# Patient Record
Sex: MALE | Race: OTHER | NOT HISPANIC OR LATINO | URBAN - METROPOLITAN AREA
[De-identification: names, ages, dates, MRNs, and addresses within clinical notes are randomized per-mention and may not be internally consistent; named-entity substitution may affect disease eponyms.]

---

## 2019-08-28 ENCOUNTER — INPATIENT (INPATIENT)
Facility: HOSPITAL | Age: 69
LOS: 0 days | Discharge: HOME | DRG: 287 | End: 2019-08-28
Attending: INTERNAL MEDICINE | Admitting: INTERNAL MEDICINE
Payer: MEDICARE

## 2019-08-28 VITALS
HEART RATE: 57 BPM | TEMPERATURE: 98 F | DIASTOLIC BLOOD PRESSURE: 68 MMHG | SYSTOLIC BLOOD PRESSURE: 124 MMHG | RESPIRATION RATE: 18 BRPM | OXYGEN SATURATION: 100 %

## 2019-08-28 DIAGNOSIS — Z98.890 OTHER SPECIFIED POSTPROCEDURAL STATES: Chronic | ICD-10-CM

## 2019-08-28 LAB
ALBUMIN SERPL ELPH-MCNC: 4.4 G/DL — SIGNIFICANT CHANGE UP (ref 3.3–5)
ALP SERPL-CCNC: 46 U/L — SIGNIFICANT CHANGE UP (ref 40–120)
ALT FLD-CCNC: 58 U/L — HIGH (ref 10–45)
ANION GAP SERPL CALC-SCNC: 11 MMOL/L — SIGNIFICANT CHANGE UP (ref 5–17)
APTT BLD: 38.4 SEC — HIGH (ref 27.5–36.3)
AST SERPL-CCNC: 44 U/L — HIGH (ref 10–40)
BASOPHILS # BLD AUTO: 0.02 K/UL — SIGNIFICANT CHANGE UP (ref 0–0.2)
BASOPHILS NFR BLD AUTO: 0.3 % — SIGNIFICANT CHANGE UP (ref 0–2)
BILIRUB SERPL-MCNC: 0.6 MG/DL — SIGNIFICANT CHANGE UP (ref 0.2–1.2)
BUN SERPL-MCNC: 8 MG/DL — SIGNIFICANT CHANGE UP (ref 7–23)
CALCIUM SERPL-MCNC: 9.5 MG/DL — SIGNIFICANT CHANGE UP (ref 8.4–10.5)
CHLORIDE SERPL-SCNC: 97 MMOL/L — SIGNIFICANT CHANGE UP (ref 96–108)
CHOLEST SERPL-MCNC: 168 MG/DL — SIGNIFICANT CHANGE UP (ref 10–199)
CK MB CFR SERPL CALC: 3 NG/ML — SIGNIFICANT CHANGE UP (ref 0–6.7)
CK SERPL-CCNC: 94 U/L — SIGNIFICANT CHANGE UP (ref 30–200)
CO2 SERPL-SCNC: 26 MMOL/L — SIGNIFICANT CHANGE UP (ref 22–31)
CREAT SERPL-MCNC: 1.06 MG/DL — SIGNIFICANT CHANGE UP (ref 0.5–1.3)
EOSINOPHIL # BLD AUTO: 0.17 K/UL — SIGNIFICANT CHANGE UP (ref 0–0.5)
EOSINOPHIL NFR BLD AUTO: 2.2 % — SIGNIFICANT CHANGE UP (ref 0–6)
GLUCOSE BLDC GLUCOMTR-MCNC: 127 MG/DL — HIGH (ref 70–99)
GLUCOSE SERPL-MCNC: 143 MG/DL — HIGH (ref 70–99)
HBA1C BLD-MCNC: 7.3 % — HIGH (ref 4–5.6)
HCT VFR BLD CALC: 43.6 % — SIGNIFICANT CHANGE UP (ref 39–50)
HDLC SERPL-MCNC: 56 MG/DL — SIGNIFICANT CHANGE UP
HGB BLD-MCNC: 14.9 G/DL — SIGNIFICANT CHANGE UP (ref 13–17)
IMM GRANULOCYTES NFR BLD AUTO: 0.3 % — SIGNIFICANT CHANGE UP (ref 0–1.5)
INR BLD: 1.03 — SIGNIFICANT CHANGE UP (ref 0.88–1.16)
LIPID PNL WITH DIRECT LDL SERPL: 87 MG/DL — SIGNIFICANT CHANGE UP
LYMPHOCYTES # BLD AUTO: 3.58 K/UL — HIGH (ref 1–3.3)
LYMPHOCYTES # BLD AUTO: 46.7 % — HIGH (ref 13–44)
MCHC RBC-ENTMCNC: 30.5 PG — SIGNIFICANT CHANGE UP (ref 27–34)
MCHC RBC-ENTMCNC: 34.2 GM/DL — SIGNIFICANT CHANGE UP (ref 32–36)
MCV RBC AUTO: 89.2 FL — SIGNIFICANT CHANGE UP (ref 80–100)
MONOCYTES # BLD AUTO: 0.57 K/UL — SIGNIFICANT CHANGE UP (ref 0–0.9)
MONOCYTES NFR BLD AUTO: 7.4 % — SIGNIFICANT CHANGE UP (ref 2–14)
NEUTROPHILS # BLD AUTO: 3.3 K/UL — SIGNIFICANT CHANGE UP (ref 1.8–7.4)
NEUTROPHILS NFR BLD AUTO: 43.1 % — SIGNIFICANT CHANGE UP (ref 43–77)
NRBC # BLD: 0 /100 WBCS — SIGNIFICANT CHANGE UP (ref 0–0)
PLATELET # BLD AUTO: 217 K/UL — SIGNIFICANT CHANGE UP (ref 150–400)
POTASSIUM SERPL-MCNC: 4.7 MMOL/L — SIGNIFICANT CHANGE UP (ref 3.5–5.3)
POTASSIUM SERPL-SCNC: 4.7 MMOL/L — SIGNIFICANT CHANGE UP (ref 3.5–5.3)
PROT SERPL-MCNC: 7.7 G/DL — SIGNIFICANT CHANGE UP (ref 6–8.3)
PROTHROM AB SERPL-ACNC: 11.6 SEC — SIGNIFICANT CHANGE UP (ref 10–12.9)
RBC # BLD: 4.89 M/UL — SIGNIFICANT CHANGE UP (ref 4.2–5.8)
RBC # FLD: 12.7 % — SIGNIFICANT CHANGE UP (ref 10.3–14.5)
SODIUM SERPL-SCNC: 134 MMOL/L — LOW (ref 135–145)
TOTAL CHOLESTEROL/HDL RATIO MEASUREMENT: 3 RATIO — LOW (ref 3.4–9.6)
TRIGL SERPL-MCNC: 126 MG/DL — SIGNIFICANT CHANGE UP (ref 10–149)
WBC # BLD: 7.66 K/UL — SIGNIFICANT CHANGE UP (ref 3.8–10.5)
WBC # FLD AUTO: 7.66 K/UL — SIGNIFICANT CHANGE UP (ref 3.8–10.5)

## 2019-08-28 PROCEDURE — 82550 ASSAY OF CK (CPK): CPT

## 2019-08-28 PROCEDURE — 85730 THROMBOPLASTIN TIME PARTIAL: CPT

## 2019-08-28 PROCEDURE — 82962 GLUCOSE BLOOD TEST: CPT

## 2019-08-28 PROCEDURE — C1887: CPT

## 2019-08-28 PROCEDURE — 85025 COMPLETE CBC W/AUTO DIFF WBC: CPT

## 2019-08-28 PROCEDURE — 83036 HEMOGLOBIN GLYCOSYLATED A1C: CPT

## 2019-08-28 PROCEDURE — 93454 CORONARY ARTERY ANGIO S&I: CPT | Mod: 26

## 2019-08-28 PROCEDURE — C1894: CPT

## 2019-08-28 PROCEDURE — 80061 LIPID PANEL: CPT

## 2019-08-28 PROCEDURE — 36415 COLL VENOUS BLD VENIPUNCTURE: CPT

## 2019-08-28 PROCEDURE — 85610 PROTHROMBIN TIME: CPT

## 2019-08-28 PROCEDURE — C1769: CPT

## 2019-08-28 PROCEDURE — 82553 CREATINE MB FRACTION: CPT

## 2019-08-28 PROCEDURE — 80053 COMPREHEN METABOLIC PANEL: CPT

## 2019-08-28 RX ORDER — GLUCAGON INJECTION, SOLUTION 0.5 MG/.1ML
1 INJECTION, SOLUTION SUBCUTANEOUS ONCE
Refills: 0 | Status: DISCONTINUED | OUTPATIENT
Start: 2019-08-28 | End: 2019-08-28

## 2019-08-28 RX ORDER — CHLORHEXIDINE GLUCONATE 213 G/1000ML
1 SOLUTION TOPICAL ONCE
Refills: 0 | Status: DISCONTINUED | OUTPATIENT
Start: 2019-08-28 | End: 2019-08-28

## 2019-08-28 RX ORDER — INSULIN LISPRO 100/ML
VIAL (ML) SUBCUTANEOUS
Refills: 0 | Status: DISCONTINUED | OUTPATIENT
Start: 2019-08-28 | End: 2019-08-28

## 2019-08-28 RX ORDER — DEXTROSE 50 % IN WATER 50 %
15 SYRINGE (ML) INTRAVENOUS ONCE
Refills: 0 | Status: DISCONTINUED | OUTPATIENT
Start: 2019-08-28 | End: 2019-08-28

## 2019-08-28 RX ORDER — DEXTROSE 50 % IN WATER 50 %
25 SYRINGE (ML) INTRAVENOUS ONCE
Refills: 0 | Status: DISCONTINUED | OUTPATIENT
Start: 2019-08-28 | End: 2019-08-28

## 2019-08-28 RX ORDER — LORATADINE 10 MG/1
1 TABLET ORAL
Qty: 0 | Refills: 0 | DISCHARGE

## 2019-08-28 RX ORDER — CLOPIDOGREL BISULFATE 75 MG/1
600 TABLET, FILM COATED ORAL ONCE
Refills: 0 | Status: DISCONTINUED | OUTPATIENT
Start: 2019-08-28 | End: 2019-08-28

## 2019-08-28 RX ORDER — METFORMIN HYDROCHLORIDE 850 MG/1
1 TABLET ORAL
Qty: 0 | Refills: 0 | DISCHARGE

## 2019-08-28 RX ORDER — LINACLOTIDE 145 UG/1
1 CAPSULE, GELATIN COATED ORAL
Qty: 0 | Refills: 0 | DISCHARGE

## 2019-08-28 RX ORDER — TAMSULOSIN HYDROCHLORIDE 0.4 MG/1
1 CAPSULE ORAL
Qty: 0 | Refills: 0 | DISCHARGE

## 2019-08-28 RX ORDER — DIVALPROEX SODIUM 500 MG/1
1 TABLET, DELAYED RELEASE ORAL
Qty: 0 | Refills: 0 | DISCHARGE

## 2019-08-28 RX ORDER — ASPIRIN/CALCIUM CARB/MAGNESIUM 324 MG
1 TABLET ORAL
Qty: 0 | Refills: 0 | DISCHARGE

## 2019-08-28 RX ORDER — DICLOFENAC SODIUM 30 MG/G
0 GEL TOPICAL
Qty: 0 | Refills: 0 | DISCHARGE

## 2019-08-28 RX ORDER — SODIUM CHLORIDE 9 MG/ML
1000 INJECTION, SOLUTION INTRAVENOUS
Refills: 0 | Status: DISCONTINUED | OUTPATIENT
Start: 2019-08-28 | End: 2019-08-28

## 2019-08-28 RX ORDER — SODIUM CHLORIDE 9 MG/ML
500 INJECTION INTRAMUSCULAR; INTRAVENOUS; SUBCUTANEOUS
Refills: 0 | Status: DISCONTINUED | OUTPATIENT
Start: 2019-08-28 | End: 2019-08-28

## 2019-08-28 RX ORDER — DEXTROSE 50 % IN WATER 50 %
12.5 SYRINGE (ML) INTRAVENOUS ONCE
Refills: 0 | Status: DISCONTINUED | OUTPATIENT
Start: 2019-08-28 | End: 2019-08-28

## 2019-08-28 RX ORDER — ASPIRIN/CALCIUM CARB/MAGNESIUM 324 MG
1 TABLET ORAL
Qty: 30 | Refills: 3
Start: 2019-08-28 | End: 2019-12-25

## 2019-08-28 RX ORDER — LEVETIRACETAM 250 MG/1
1 TABLET, FILM COATED ORAL
Qty: 0 | Refills: 0 | DISCHARGE

## 2019-08-28 RX ORDER — ESCITALOPRAM OXALATE 10 MG/1
1 TABLET, FILM COATED ORAL
Qty: 0 | Refills: 0 | DISCHARGE

## 2019-08-28 NOTE — DISCHARGE NOTE PROVIDER - NSDCCPCAREPLAN_GEN_ALL_CORE_FT
PRINCIPAL DISCHARGE DIAGNOSIS  Diagnosis: Nonobstructive atherosclerosis of coronary artery  Assessment and Plan of Treatment: You were transferred to [facility] for cardiac catheterization revealing no significant blockages in your heart that needed to be fixed. Your Left Anterior Descending Artery had a 40% plaque build up which is not significant enough to fix with a stent.   Please start taking Aspirin 81 mg daily to prevent heart attack and stroke.   Please follow up with your Primary Care Doctor or cardiologist within one week of your discharge.      SECONDARY DISCHARGE DIAGNOSES  Diagnosis: Diabetes  Assessment and Plan of Treatment: DO NOT TAKE YOUR METFORMIN FOR TWO DAYS: RESTART IT 8/31/2019  This medication can interact with the contrast used during your procedure therefore we want to ensure the contrast has left your body prior to you restarting your Metformin.   Make sure you monitor  diet while you are not taking your Metformin!      Diagnosis: Seizures  Assessment and Plan of Treatment: Please continue your home medication regimen PRINCIPAL DISCHARGE DIAGNOSIS  Diagnosis: Nonobstructive atherosclerosis of coronary artery  Assessment and Plan of Treatment: You were transferred to [facility] for cardiac catheterization revealing no significant blockages in your heart that needed to be fixed. Your Left Anterior Descending Artery had a 40% plaque build up which is not significant enough to fix with a stent.   Please start taking Aspirin 81 mg daily to prevent heart attack and stroke.   Please follow up with your Primary Care Doctor within one week of your discharge.  Please follow up with cardiologist Dr. Bhardwaj in 2-3 weeks post discharge.      SECONDARY DISCHARGE DIAGNOSES  Diagnosis: Diabetes  Assessment and Plan of Treatment: DO NOT TAKE YOUR METFORMIN FOR TWO DAYS: RESTART IT 8/31/2019  This medication can interact with the contrast used during your procedure therefore we want to ensure the contrast has left your body prior to you restarting your Metformin.   Make sure you monitor  diet while you are not taking your Metformin!      Diagnosis: Seizures  Assessment and Plan of Treatment: Please continue your home medication regimen

## 2019-08-28 NOTE — H&P ADULT - ADDITIONAL PE
ASA: II  Mallamapti: II  12 Lead EKG: Pending ASA: II  Mallamapti: II  12 Lead EKG: Sinus Bradycardia @ 51 BPM, no acute changes

## 2019-08-28 NOTE — H&P ADULT - HISTORY OF PRESENT ILLNESS
SKELETON    70 yo male with a PMhx of HTN, DM, TBI with subsequent seizure disorder (LAST SEIZURE??), chronic hyponatremia, GERD presented to [facility] 8/26/19 with the complaint of 3 episodes of non bilious vomiting, acid reflux, hiccups, and bloating that started that morning. Per patient's daughter he was complaining of chest pain two days prior to ED visit and endorsed he can only walk two blocks before having SOB.  Patient denied palpitations, dizziness, fever, chills, abdominal pain. Pertinent lab values on arrival included negative troponin . 12 Lead EKG revealed NSR with PVC, LVH and no significant ST wave changes. Cardiology was consulted and recommended ischemic work up. Nuclear Stress Test (8/27/2019) revealed small sized, mild intensity reversible defect in the inferior wall. LVEF 60%. Prior Echo (7/12/2019) revealed LVEF 60-65%, moderate AR, mild to moderate MR. Patient is now transferred for cardiac catheterization with possible intervention if clinically indicated due to patient's risk factors, abnormal NST and clinical presentation. History obtained from patient, daughter Liz and transfer paperwork      70 yo Lao Speaking male, POOR HISTORIAN with a PMhx of resting tremor, HTN, DM, TBI 2010 with subsequent seizure disorder with last episode 7/17/2019 with hospitalization at [place] complicated by "mild CVA", chronic hyponatremia, GERD presented to [facility] 8/26/19 with the complaint of 3 episodes of non-bilious vomiting, acid reflux, hiccups, and bloating that started that morning. Per patient's daughter over the weekend he was clenching the right sided of his chest but denied chest pain. For last three months, patient complains of resting SOB and COLEMAN when ambulating more than ½ a block. Patient denies any palpitations, dizziness, syncope, leg edema, fever, chills, abdominal pain. Pertinent lab values on arrival included negative troponin . 12 Lead EKG revealed NSR with PVC, LVH and no significant ST wave changes. CXR X-Ray (8/26/2019) revealed no acute pathology. CT Abdomen and Pelvis IV 8/26/2019) revealed fatty accumulation with in hepatocytes, no focal hepatic lesion. Right renal cyst, hiatal hernia, enlarged prostate gland. GI symptoms deemed to be related gastritis. Cardiology was consulted due to abnormal EKG and recommended ischemic work up. Nuclear Stress Test (8/27/2019) revealed small sized, mild intensity reversible defect in the inferior wall. LVEF 60%. Prior Echo (7/12/2019) revealed LVEF 60-65%, moderate AR, mild to moderate MR. Patient is now transferred for cardiac catheterization with possible intervention if clinically indicated due to patient's risk factors, abnormal NST and clinical presentation. History obtained from patient, daughter Liz and transfer paperwork      70 yo Polish Speaking male, POOR HISTORIAN with a PMhx of resting tremor, HTN, DM, TBI 2010 (s/p fall, s/p craniectomy) with subsequent seizure disorder with last episode 7/17/2019 with hospitalization at [place] complicated by "mild CVA" (no residual deficits), chronic hyponatremia, GERD presented to [facility] 8/26/19 with the complaint of 3 episodes of non-bilious vomiting, acid reflux, hiccups, and bloating that started that morning. Per patient's daughter over the weekend he was clenching the right sided of his chest but denied chest pain. For last three months, patient complains of resting SOB and COLEMAN when ambulating more than ½ a block. Patient denies any palpitations, dizziness, syncope, leg edema, fever, chills, abdominal pain. Pertinent lab values on arrival included negative troponin . 12 Lead EKG revealed NSR with PVC, LVH and no significant ST wave changes. CXR X-Ray (8/26/2019) revealed no acute pathology. CT Abdomen and Pelvis IV 8/26/2019) revealed fatty accumulation with in hepatocytes, no focal hepatic lesion. Right renal cyst, hiatal hernia, enlarged prostate gland. GI symptoms deemed to be related gastritis. Cardiology was consulted due to abnormal EKG and recommended ischemic work up. Nuclear Stress Test (8/27/2019) revealed small sized, mild intensity reversible defect in the inferior wall. LVEF 60%. Prior Echo (7/12/2019) revealed LVEF 60-65%, moderate AR, mild to moderate MR. Patient is now transferred for cardiac catheterization with possible intervention if clinically indicated due to patient's risk factors, abnormal NST and clinical presentation. History obtained from patient, daughter Liz and transfer paperwork      70 yo Thai Speaking male, POOR HISTORIAN with a PMhx of resting tremor, HTN, DM, TBI 2010 (s/p fall, s/p craniectomy) with subsequent seizure disorder with last episode 7/17/2019 with hospitalization at [place] complicated by "mild CVA" (no residual deficits), chronic hyponatremia, GERD presented to [facility] 8/26/19 with the complaint of 3 episodes of non-bilious vomiting, acid reflux, hiccups, and bloating that started that morning. Per patient's daughter over the weekend he was clenching the right sided of his chest but denied chest pain. For last three months, patient complains of resting SOB and COLEMAN when ambulating more than ½ a block. Patient denies any palpitations, dizziness, syncope, leg edema, fever, chills, abdominal pain. Pertinent lab values on arrival included negative troponin . 12 Lead EKG revealed NSR with PVC, LVH and no significant ST wave changes. CXR X-Ray (8/26/2019) revealed no acute pathology. CT Abdomen and Pelvis IV 8/26/2019) revealed fatty accumulation with in hepatocytes, no focal hepatic lesion. Right renal cyst, hiatal hernia, enlarged prostate gland. GI symptoms deemed to be related gastritis. Cardiology was consulted due to abnormal EKG and recommended ischemic work up. Nuclear Stress Test (8/27/2019) revealed small sized, mild intensity reversible defect in the inferior wall. LVEF 60%. Prior Echo (7/12/2019) revealed LVEF 60-65%, moderate AR, mild to moderate MR. Pt was started on ASA 81 mg daily and Lipitor 40 mg daily in addition to home medication regimen. Patient is now transferred for cardiac catheterization with possible intervention if clinically indicated due to patient's risk factors, abnormal NST and clinical presentation.

## 2019-08-28 NOTE — DISCHARGE NOTE PROVIDER - PROVIDER TOKENS
FREE:[LAST:[Bettie],FIRST:[Caty],PHONE:[phone]],FAX:[(   )    -],FOLLOWUP:[1 week]] FREE:[LAST:[Alexandre],FIRST:[Caty],PHONE:[phone]],FAX:[(   )    -],FOLLOWUP:[1 week]],PROVIDER:[TOKEN:[[id number]],FOLLOWUP:[2 weeks]]

## 2019-08-28 NOTE — DISCHARGE NOTE NURSING/CASE MANAGEMENT/SOCIAL WORK - PATIENT PORTAL LINK FT
You can access the FollowMyHealth Patient Portal offered by [facility] by registering at the following website: [email or web address]. By joining [facility]’s FollowMyHealth portal, you will also be able to view your health information using other applications (apps) compatible with our system.

## 2019-08-28 NOTE — DISCHARGE NOTE PROVIDER - CARE PROVIDER_API CALL
Caty Alexandre  Phone: [phone]  Fax: (   )    -  Follow Up Time: 1 week Caty Alexandre  Phone: [phone]  Fax: (   )    -  Follow Up Time: 1 week    Canelo Bhardwaj)  Cardiology; Internal Medicine  [address] [place] [address]  Phone: [phone]  Fax: [phone]  Follow Up Time: 2 weeks

## 2019-08-28 NOTE — H&P ADULT - ASSESSMENT
ASA:                              Mallampati:  Risks & benefits of procedure and alternative therapy have been explained to the patient including but not limited to: allergic reaction, bleeding w/possible need for blood transfusion, infection, renal and vascular compromise, limb damage, arrhythmia, stroke, vessel dissection/perforation, Myocardial infarction, emergent CABG. Informed consent obtained and in chart. 68 yo Yoruba Speaking male, POOR HISTORIAN with a PMhx of resting tremor, HTN, DM, TBI 2010 with subsequent seizure disorder with last episode 7/17/2019 with hospitalization at [place] complicated by "mild CVA", chronic hyponatremia, GERD presented to [facility] with gastritis, found to have abnormal EKG with subsequent abnormal NST. Patient now presents for cardiac catheterization with possible intervention if clinically indicated.     -Patient received ASA 81 mg at referring hospital this AM. Will load with Plavix 600 mg x one dose  -NS @ 75 cc/hour initiated   -Patient appeared SOB on arrival (Per daughter this is his baseline for several months). Patient's lungs CTA b/l, comfortable laying flat @ 30 degree angle. O2 sat stable on RA. Patient placed on 3L NC for comfort.    -Home medication regimen reviewed with patient's daughter at bedside and put into H+P medication list.      Risks & benefits of procedure and alternative therapy have been explained to the patient including but not limited to: allergic reaction, bleeding w/possible need for blood transfusion, infection, renal and vascular compromise, limb damage, arrhythmia, stroke, vessel dissection/perforation, Myocardial infarction, emergent CABG. Informed consent obtained and in chart. 70 yo Telugu Speaking male, POOR HISTORIAN with a PMhx of resting tremor, HTN, DM, TBI 2010 with subsequent seizure disorder with last episode 7/17/2019 with hospitalization at [place] complicated by "mild CVA", chronic hyponatremia, GERD presented to [facility] with gastritis, found to have abnormal EKG with subsequent abnormal NST. Patient now presents for cardiac catheterization with possible intervention if clinically indicated.     -Patient received ASA 81 mg at referring hospital this AM. Will load with Plavix 600 mg x one dose as discussed with Dr. Ghotra  -NS @ 75 cc/hour initiated   -Patient appeared SOB on arrival (Per daughter this is his baseline for several months). Patient's lungs CTA b/l, comfortable laying flat @ 30 degree angle. O2 sat stable on RA. Patient placed on 3L NC for comfort.    -Home medication regimen reviewed with patient's daughter at bedside and put into H+P medication list.      Risks & benefits of procedure and alternative therapy have been explained to the patient including but not limited to: allergic reaction, bleeding w/possible need for blood transfusion, infection, renal and vascular compromise, limb damage, arrhythmia, stroke, vessel dissection/perforation, Myocardial infarction, emergent CABG. Informed consent obtained and in chart.

## 2019-08-28 NOTE — H&P ADULT - NSICDXPASTMEDICALHX_GEN_ALL_CORE_FT
PAST MEDICAL HISTORY:  Diabetes     H/O gastroesophageal reflux (GERD)     Hyperlipidemia     Hypertension     Seizure     TBI (traumatic brain injury)

## 2019-08-28 NOTE — H&P ADULT - RS GEN PE MLT RESP DETAILS PC
no rales/no rhonchi/no wheezes/clear to auscultation bilaterally no rales/no rhonchi/no wheezes/clear to auscultation bilaterally/respirations non-labored/breath sounds equal/good air movement

## 2019-08-28 NOTE — DISCHARGE NOTE PROVIDER - NSDCFUADDINST_GEN_ALL_CORE_FT
•	Your procedure was done through your groin or your wrist  •	You do not need to keep this area covered and you may shower  -Please avoid any heavy lifting  (no more than 3 to 5 lbs) or strenuous activity for five days    If you develop any swelling, bleeding, hardening of the skin (hematoma formation), acute pain, numbness/tingling  in your arm please contact your doctor immediately or call our 24/7 line: [phone]

## 2019-08-28 NOTE — DISCHARGE NOTE PROVIDER - HOSPITAL COURSE
70 yo Armenian Speaking male, POOR HISTORIAN with a PMhx of resting tremor, HTN, DM, TBI 2010 (s/p fall, s/p craniectomy) with subsequent seizure disorder with last episode 7/17/2019 with hospitalization at [place] complicated by "mild CVA" (no residual deficits), chronic hyponatremia, GERD presented to [facility] 8/26/19 with the complaint of 3 episodes of non-bilious vomiting, acid reflux, hiccups, and bloating that started that morning. Per patient's daughter over the weekend he was clenching the right sided of his chest but denied chest pain. For last three months, patient complains of resting SOB and COLEMAN when ambulating more than ½ a block. Patient denies any palpitations, dizziness, syncope, leg edema, fever, chills, abdominal pain. Pertinent lab values on arrival included negative troponin . 12 Lead EKG revealed NSR with PVC, LVH and no significant ST wave changes. CXR X-Ray (8/26/2019) revealed no acute pathology. CT Abdomen and Pelvis IV 8/26/2019) revealed fatty accumulation with in hepatocytes, no focal hepatic lesion. Right renal cyst, hiatal hernia, enlarged prostate gland. GI symptoms deemed to be related gastritis. Cardiology was consulted due to abnormal EKG and recommended ischemic work up. Nuclear Stress Test (8/27/2019) revealed small sized, mild intensity reversible defect in the inferior wall. LVEF 60%. Prior Echo (7/12/2019) revealed LVEF 60-65%, moderate AR, mild to moderate MR. Pt was started on ASA 81 mg daily and Lipitor 40 mg daily in addition to home medication regimen. Patient was transferred to [facility] for cardiac catheterization 8/28/2019 revealing non obstructive CAD: mLAD 40%. Per Dr. Leos patient to add ASA 81 mg daily to regimen, patient’s PCP to consider initiate of statin therapy per Dr. Leos. Patient is cleared for discharge today per Dr. Leos and will follow up with Dr. Littlejohn in 1 week.

## 2019-08-31 RX ORDER — METFORMIN HYDROCHLORIDE 850 MG/1
1 TABLET ORAL
Qty: 0 | Refills: 0 | DISCHARGE
Start: 2019-08-31

## 2019-09-03 DIAGNOSIS — E78.5 HYPERLIPIDEMIA, UNSPECIFIED: ICD-10-CM

## 2019-09-03 DIAGNOSIS — K21.9 GASTRO-ESOPHAGEAL REFLUX DISEASE WITHOUT ESOPHAGITIS: ICD-10-CM

## 2019-09-03 DIAGNOSIS — N28.1 CYST OF KIDNEY, ACQUIRED: ICD-10-CM

## 2019-09-03 DIAGNOSIS — E87.1 HYPO-OSMOLALITY AND HYPONATREMIA: ICD-10-CM

## 2019-09-03 DIAGNOSIS — G40.909 EPILEPSY, UNSPECIFIED, NOT INTRACTABLE, WITHOUT STATUS EPILEPTICUS: ICD-10-CM

## 2019-09-03 DIAGNOSIS — I25.10 ATHEROSCLEROTIC HEART DISEASE OF NATIVE CORONARY ARTERY WITHOUT ANGINA PECTORIS: ICD-10-CM

## 2019-09-03 DIAGNOSIS — Z86.73 PERSONAL HISTORY OF TRANSIENT ISCHEMIC ATTACK (TIA), AND CEREBRAL INFARCTION WITHOUT RESIDUAL DEFICITS: ICD-10-CM

## 2019-09-03 DIAGNOSIS — I10 ESSENTIAL (PRIMARY) HYPERTENSION: ICD-10-CM

## 2019-09-03 DIAGNOSIS — Z79.84 LONG TERM (CURRENT) USE OF ORAL HYPOGLYCEMIC DRUGS: ICD-10-CM

## 2019-09-03 DIAGNOSIS — R94.31 ABNORMAL ELECTROCARDIOGRAM [ECG] [EKG]: ICD-10-CM

## 2019-09-03 DIAGNOSIS — I08.0 RHEUMATIC DISORDERS OF BOTH MITRAL AND AORTIC VALVES: ICD-10-CM

## 2019-09-03 DIAGNOSIS — Z87.820 PERSONAL HISTORY OF TRAUMATIC BRAIN INJURY: ICD-10-CM

## 2019-09-03 DIAGNOSIS — K44.9 DIAPHRAGMATIC HERNIA WITHOUT OBSTRUCTION OR GANGRENE: ICD-10-CM

## 2019-09-03 DIAGNOSIS — K29.70 GASTRITIS, UNSPECIFIED, WITHOUT BLEEDING: ICD-10-CM

## 2019-09-03 DIAGNOSIS — E11.9 TYPE 2 DIABETES MELLITUS WITHOUT COMPLICATIONS: ICD-10-CM

## 2024-12-01 NOTE — DISCHARGE NOTE NURSING/CASE MANAGEMENT/SOCIAL WORK - NSDCPETBCESMAN_GEN_ALL_CORE
If you are a smoker, it is important for your health to stop smoking. Please be aware that second hand smoke is also harmful.
9